# Patient Record
Sex: MALE | Race: WHITE
[De-identification: names, ages, dates, MRNs, and addresses within clinical notes are randomized per-mention and may not be internally consistent; named-entity substitution may affect disease eponyms.]

---

## 2019-10-24 ENCOUNTER — HOSPITAL ENCOUNTER (INPATIENT)
Dept: HOSPITAL 35 - ER | Age: 74
LOS: 7 days | DRG: 207 | End: 2019-10-31
Attending: INTERNAL MEDICINE | Admitting: INTERNAL MEDICINE
Payer: COMMERCIAL

## 2019-10-24 VITALS — SYSTOLIC BLOOD PRESSURE: 141 MMHG | DIASTOLIC BLOOD PRESSURE: 84 MMHG

## 2019-10-24 VITALS — SYSTOLIC BLOOD PRESSURE: 121 MMHG | DIASTOLIC BLOOD PRESSURE: 75 MMHG

## 2019-10-24 VITALS — SYSTOLIC BLOOD PRESSURE: 131 MMHG | DIASTOLIC BLOOD PRESSURE: 81 MMHG

## 2019-10-24 VITALS — SYSTOLIC BLOOD PRESSURE: 94 MMHG | DIASTOLIC BLOOD PRESSURE: 65 MMHG

## 2019-10-24 VITALS — DIASTOLIC BLOOD PRESSURE: 58 MMHG | SYSTOLIC BLOOD PRESSURE: 90 MMHG

## 2019-10-24 VITALS — DIASTOLIC BLOOD PRESSURE: 99 MMHG | SYSTOLIC BLOOD PRESSURE: 137 MMHG

## 2019-10-24 VITALS — SYSTOLIC BLOOD PRESSURE: 128 MMHG | DIASTOLIC BLOOD PRESSURE: 90 MMHG

## 2019-10-24 VITALS — DIASTOLIC BLOOD PRESSURE: 67 MMHG | SYSTOLIC BLOOD PRESSURE: 90 MMHG

## 2019-10-24 VITALS — DIASTOLIC BLOOD PRESSURE: 65 MMHG | SYSTOLIC BLOOD PRESSURE: 94 MMHG

## 2019-10-24 VITALS — DIASTOLIC BLOOD PRESSURE: 82 MMHG | SYSTOLIC BLOOD PRESSURE: 133 MMHG

## 2019-10-24 VITALS — SYSTOLIC BLOOD PRESSURE: 123 MMHG | DIASTOLIC BLOOD PRESSURE: 81 MMHG

## 2019-10-24 VITALS — SYSTOLIC BLOOD PRESSURE: 119 MMHG | DIASTOLIC BLOOD PRESSURE: 83 MMHG

## 2019-10-24 VITALS — DIASTOLIC BLOOD PRESSURE: 70 MMHG | SYSTOLIC BLOOD PRESSURE: 97 MMHG

## 2019-10-24 VITALS — DIASTOLIC BLOOD PRESSURE: 76 MMHG | SYSTOLIC BLOOD PRESSURE: 117 MMHG

## 2019-10-24 VITALS — HEIGHT: 74.02 IN | BODY MASS INDEX: 29.65 KG/M2 | WEIGHT: 231.04 LBS

## 2019-10-24 VITALS — DIASTOLIC BLOOD PRESSURE: 79 MMHG | SYSTOLIC BLOOD PRESSURE: 122 MMHG

## 2019-10-24 VITALS — DIASTOLIC BLOOD PRESSURE: 74 MMHG | SYSTOLIC BLOOD PRESSURE: 120 MMHG

## 2019-10-24 VITALS — DIASTOLIC BLOOD PRESSURE: 98 MMHG | SYSTOLIC BLOOD PRESSURE: 126 MMHG

## 2019-10-24 VITALS — DIASTOLIC BLOOD PRESSURE: 89 MMHG | SYSTOLIC BLOOD PRESSURE: 146 MMHG

## 2019-10-24 VITALS — SYSTOLIC BLOOD PRESSURE: 96 MMHG | DIASTOLIC BLOOD PRESSURE: 77 MMHG

## 2019-10-24 VITALS — DIASTOLIC BLOOD PRESSURE: 87 MMHG | SYSTOLIC BLOOD PRESSURE: 138 MMHG

## 2019-10-24 VITALS — DIASTOLIC BLOOD PRESSURE: 76 MMHG | SYSTOLIC BLOOD PRESSURE: 126 MMHG

## 2019-10-24 VITALS — SYSTOLIC BLOOD PRESSURE: 100 MMHG | DIASTOLIC BLOOD PRESSURE: 77 MMHG

## 2019-10-24 VITALS — SYSTOLIC BLOOD PRESSURE: 113 MMHG | DIASTOLIC BLOOD PRESSURE: 83 MMHG

## 2019-10-24 VITALS — SYSTOLIC BLOOD PRESSURE: 111 MMHG | DIASTOLIC BLOOD PRESSURE: 69 MMHG

## 2019-10-24 VITALS — SYSTOLIC BLOOD PRESSURE: 103 MMHG | DIASTOLIC BLOOD PRESSURE: 63 MMHG

## 2019-10-24 VITALS — DIASTOLIC BLOOD PRESSURE: 76 MMHG | SYSTOLIC BLOOD PRESSURE: 109 MMHG

## 2019-10-24 DIAGNOSIS — G25.3: ICD-10-CM

## 2019-10-24 DIAGNOSIS — Z93.1: ICD-10-CM

## 2019-10-24 DIAGNOSIS — I47.1: ICD-10-CM

## 2019-10-24 DIAGNOSIS — I42.9: ICD-10-CM

## 2019-10-24 DIAGNOSIS — I25.10: ICD-10-CM

## 2019-10-24 DIAGNOSIS — D72.829: ICD-10-CM

## 2019-10-24 DIAGNOSIS — J96.22: ICD-10-CM

## 2019-10-24 DIAGNOSIS — J93.9: ICD-10-CM

## 2019-10-24 DIAGNOSIS — Z82.49: ICD-10-CM

## 2019-10-24 DIAGNOSIS — E11.9: ICD-10-CM

## 2019-10-24 DIAGNOSIS — G93.1: ICD-10-CM

## 2019-10-24 DIAGNOSIS — G47.33: ICD-10-CM

## 2019-10-24 DIAGNOSIS — Z88.0: ICD-10-CM

## 2019-10-24 DIAGNOSIS — Z95.5: ICD-10-CM

## 2019-10-24 DIAGNOSIS — J96.21: Primary | ICD-10-CM

## 2019-10-24 DIAGNOSIS — Z87.891: ICD-10-CM

## 2019-10-24 DIAGNOSIS — E87.1: ICD-10-CM

## 2019-10-24 DIAGNOSIS — Z88.2: ICD-10-CM

## 2019-10-24 DIAGNOSIS — I10: ICD-10-CM

## 2019-10-24 DIAGNOSIS — E78.5: ICD-10-CM

## 2019-10-24 DIAGNOSIS — J43.9: ICD-10-CM

## 2019-10-24 DIAGNOSIS — I46.9: ICD-10-CM

## 2019-10-24 DIAGNOSIS — Z66: ICD-10-CM

## 2019-10-24 LAB
ANION GAP SERPL CALC-SCNC: 12 MMOL/L (ref 7–16)
ANION GAP SERPL CALC-SCNC: 6 MMOL/L (ref 7–16)
ANION GAP SERPL CALC-SCNC: 9 MMOL/L (ref 7–16)
APTT BLD: 27.6 SECONDS (ref 24.5–32.8)
APTT BLD: 27.6 SECONDS (ref 24.5–32.8)
APTT BLD: 27.8 SECONDS (ref 24.5–32.8)
BASOPHILS NFR BLD AUTO: 0.1 % (ref 0–2)
BASOPHILS NFR BLD AUTO: 0.1 % (ref 0–2)
BASOPHILS NFR BLD AUTO: 0.3 % (ref 0–2)
BASOPHILS NFR BLD AUTO: 1.3 % (ref 0–2)
BE(VIVO): -2.1 MMOL/L
BE(VIVO): -2.8 MMOL/L
BE(VIVO): -3.6 MMOL/L
BE(VIVO): -8.8 MMOL/L
BUN SERPL-MCNC: 17 MG/DL (ref 7–18)
BUN SERPL-MCNC: 18 MG/DL (ref 7–18)
BUN SERPL-MCNC: 19 MG/DL (ref 7–18)
CALCIUM SERPL-MCNC: 7.9 MG/DL (ref 8.5–10.1)
CALCIUM SERPL-MCNC: 8 MG/DL (ref 8.5–10.1)
CALCIUM SERPL-MCNC: 8.1 MG/DL (ref 8.5–10.1)
CALCIUM SERPL-MCNC: 8.2 MG/DL (ref 8.5–10.1)
CHLORIDE SERPL-SCNC: 100 MMOL/L (ref 98–107)
CHLORIDE SERPL-SCNC: 95 MMOL/L (ref 98–107)
CHLORIDE SERPL-SCNC: 98 MMOL/L (ref 98–107)
CO2 SERPL-SCNC: 22 MMOL/L (ref 21–32)
CO2 SERPL-SCNC: 25 MMOL/L (ref 21–32)
CO2 SERPL-SCNC: 26 MMOL/L (ref 21–32)
CREAT SERPL-MCNC: 0.6 MG/DL (ref 0.7–1.3)
CREAT SERPL-MCNC: 0.7 MG/DL (ref 0.7–1.3)
CREAT SERPL-MCNC: 1.2 MG/DL (ref 0.7–1.3)
EOSINOPHIL NFR BLD: 0 % (ref 0–3)
EOSINOPHIL NFR BLD: 0.1 % (ref 0–3)
EOSINOPHIL NFR BLD: 0.1 % (ref 0–3)
EOSINOPHIL NFR BLD: 1.1 % (ref 0–3)
ERYTHROCYTE [DISTWIDTH] IN BLOOD BY AUTOMATED COUNT: 14.2 % (ref 10.5–14.5)
ERYTHROCYTE [DISTWIDTH] IN BLOOD BY AUTOMATED COUNT: 14.4 % (ref 10.5–14.5)
ERYTHROCYTE [DISTWIDTH] IN BLOOD BY AUTOMATED COUNT: 14.5 % (ref 10.5–14.5)
ERYTHROCYTE [DISTWIDTH] IN BLOOD BY AUTOMATED COUNT: 14.8 % (ref 10.5–14.5)
GAS PNL BLDV: 35.6 MMHG (ref 35–45)
GLUCOSE SERPL-MCNC: 141 MG/DL (ref 74–106)
GLUCOSE SERPL-MCNC: 179 MG/DL (ref 74–106)
GLUCOSE SERPL-MCNC: 352 MG/DL (ref 74–106)
GRANULOCYTES NFR BLD MANUAL: 68.2 % (ref 36–66)
GRANULOCYTES NFR BLD MANUAL: 89.2 % (ref 36–66)
GRANULOCYTES NFR BLD MANUAL: 89.4 % (ref 36–66)
GRANULOCYTES NFR BLD MANUAL: 90.2 % (ref 36–66)
HCO3 BLD-SCNC: 20.8 MMOL/L (ref 22–26)
HCO3 BLD-SCNC: 23.1 MMOL/L (ref 22–26)
HCO3 BLD-SCNC: 23.8 MMOL/L (ref 22–26)
HCO3 BLD-SCNC: 23.9 MMOL/L (ref 22–26)
HCT VFR BLD CALC: 24.1 % (ref 42–52)
HCT VFR BLD CALC: 24.4 % (ref 42–52)
HCT VFR BLD CALC: 25.8 % (ref 42–52)
HCT VFR BLD CALC: 26.8 % (ref 42–52)
HGB BLD-MCNC: 8.1 GM/DL (ref 14–18)
HGB BLD-MCNC: 8.1 GM/DL (ref 14–18)
HGB BLD-MCNC: 8.4 GM/DL (ref 14–18)
HGB BLD-MCNC: 9 GM/DL (ref 14–18)
INR PPP: 1.2
INR PPP: 1.2
INR PPP: 1.3
LYMPHOCYTES NFR BLD AUTO: 21.6 % (ref 24–44)
LYMPHOCYTES NFR BLD AUTO: 4.8 % (ref 24–44)
LYMPHOCYTES NFR BLD AUTO: 5 % (ref 24–44)
LYMPHOCYTES NFR BLD AUTO: 5.8 % (ref 24–44)
MAGNESIUM SERPL-MCNC: 2.1 MG/DL (ref 1.8–2.4)
MAGNESIUM SERPL-MCNC: 2.5 MG/DL (ref 1.8–2.4)
MAGNESIUM SERPL-MCNC: 3.1 MG/DL (ref 1.8–2.4)
MCH RBC QN AUTO: 30.3 PG (ref 26–34)
MCH RBC QN AUTO: 30.9 PG (ref 26–34)
MCH RBC QN AUTO: 31.1 PG (ref 26–34)
MCH RBC QN AUTO: 31.1 PG (ref 26–34)
MCHC RBC AUTO-ENTMCNC: 32.4 G/DL (ref 28–37)
MCHC RBC AUTO-ENTMCNC: 33.1 G/DL (ref 28–37)
MCHC RBC AUTO-ENTMCNC: 33.5 G/DL (ref 28–37)
MCHC RBC AUTO-ENTMCNC: 33.6 G/DL (ref 28–37)
MCV RBC: 91.6 FL (ref 80–100)
MCV RBC: 91.8 FL (ref 80–100)
MCV RBC: 92.9 FL (ref 80–100)
MCV RBC: 95.8 FL (ref 80–100)
MONOCYTES NFR BLD: 4.6 % (ref 1–8)
MONOCYTES NFR BLD: 4.7 % (ref 1–8)
MONOCYTES NFR BLD: 5.6 % (ref 1–8)
MONOCYTES NFR BLD: 7.8 % (ref 1–8)
NEUTROPHILS # BLD: 11.1 THOU/UL (ref 1.4–8.2)
NEUTROPHILS # BLD: 11.9 THOU/UL (ref 1.4–8.2)
NEUTROPHILS # BLD: 13.4 THOU/UL (ref 1.4–8.2)
NEUTROPHILS # BLD: 8.3 THOU/UL (ref 1.4–8.2)
PCO2 BLD: 41 MMHG (ref 35–45)
PCO2 BLD: 50.9 MMHG (ref 35–45)
PCO2 BLD: 66.7 MMHG (ref 35–45)
PCO2 BLDV: 51.9 MMHG (ref 41–51)
PHOSPHATE SERPL-MCNC: 3.1 MG/DL (ref 2.5–4.9)
PHOSPHATE SERPL-MCNC: 3.7 MG/DL (ref 2.5–4.9)
PHOSPHATE SERPL-MCNC: 8.3 MG/DL (ref 2.5–4.9)
PLATELET # BLD: 388 THOU/UL (ref 150–400)
PLATELET # BLD: 396 THOU/UL (ref 150–400)
PLATELET # BLD: 457 THOU/UL (ref 150–400)
PLATELET # BLD: 545 THOU/UL (ref 150–400)
PO2 BLD: 113.6 MMHG (ref 80–100)
PO2 BLD: 144.4 MMHG (ref 80–100)
PO2 BLD: 84.6 MMHG (ref 80–100)
POTASSIUM SERPL-SCNC: 4 MMOL/L (ref 3.5–5.1)
POTASSIUM SERPL-SCNC: 4 MMOL/L (ref 3.5–5.1)
POTASSIUM SERPL-SCNC: 4.7 MMOL/L (ref 3.5–5.1)
PROTHROMBIN TIME: 12.5 SECONDS (ref 9.3–11.4)
PROTHROMBIN TIME: 12.8 SECONDS (ref 9.3–11.4)
PROTHROMBIN TIME: 13.1 SECONDS (ref 9.3–11.4)
RBC # BLD AUTO: 2.62 MIL/UL (ref 4.5–6)
RBC # BLD AUTO: 2.66 MIL/UL (ref 4.5–6)
RBC # BLD AUTO: 2.69 MIL/UL (ref 4.5–6)
RBC # BLD AUTO: 2.89 MIL/UL (ref 4.5–6)
SODIUM SERPL-SCNC: 129 MMOL/L (ref 136–145)
SODIUM SERPL-SCNC: 132 MMOL/L (ref 136–145)
SODIUM SERPL-SCNC: 132 MMOL/L (ref 136–145)
TROPONIN I SERPL-MCNC: 0.19 NG/ML (ref ?–0.06)
TROPONIN I SERPL-MCNC: 0.25 NG/ML (ref ?–0.06)
TROPONIN I SERPL-MCNC: <0.06 NG/ML (ref ?–0.06)
WBC # BLD AUTO: 12.2 THOU/UL (ref 4–11)
WBC # BLD AUTO: 12.4 THOU/UL (ref 4–11)
WBC # BLD AUTO: 13.2 THOU/UL (ref 4–11)
WBC # BLD AUTO: 15 THOU/UL (ref 4–11)

## 2019-10-24 PROCEDURE — 0W9930Z DRAINAGE OF RIGHT PLEURAL CAVITY WITH DRAINAGE DEVICE, PERCUTANEOUS APPROACH: ICD-10-PCS

## 2019-10-24 PROCEDURE — 03HY32Z INSERTION OF MONITORING DEVICE INTO UPPER ARTERY, PERCUTANEOUS APPROACH: ICD-10-PCS

## 2019-10-24 PROCEDURE — 85026: CPT

## 2019-10-24 PROCEDURE — 02HV33Z INSERTION OF INFUSION DEVICE INTO SUPERIOR VENA CAVA, PERCUTANEOUS APPROACH: ICD-10-PCS

## 2019-10-24 PROCEDURE — 0BH17EZ INSERTION OF ENDOTRACHEAL AIRWAY INTO TRACHEA, VIA NATURAL OR ARTIFICIAL OPENING: ICD-10-PCS | Performed by: INTERNAL MEDICINE

## 2019-10-24 PROCEDURE — 10078: CPT

## 2019-10-24 PROCEDURE — B548ZZA ULTRASONOGRAPHY OF SUPERIOR VENA CAVA, GUIDANCE: ICD-10-PCS

## 2019-10-24 PROCEDURE — 5A1955Z RESPIRATORY VENTILATION, GREATER THAN 96 CONSECUTIVE HOURS: ICD-10-PCS | Performed by: INTERNAL MEDICINE

## 2019-10-24 NOTE — EMS
[facility]
[address]
[place]   [address]                     EMS Patient Care Report       
_______________________________________________________________________________
 
Name:       MARGARITA GIBSON               Room #:         170-1       ADM IN  
M.R.#:      [id number]                       Account #:      [id number]  
Admission:  10/24/19    Attend Phys:    Sunny Trevino MD    
Discharge:              Date of Birth:  [birth year]  
                                                          Report #: [id number]
                                                                    [id number]
_______________________________________________________________________________
THIS REPORT FOR:   //name//                          
 
Report Transmitted: 10/24/2019 06:39
EMS Care Summary
[facility] MED-ACT
Incident [id number] @ 10/24/2019 02:39
 
Incident Location
[address] [place] [address]

[place] [address]
 
Patient
MARGARITA GIBSON
Male, 74 Years
 1945
 
Patient Address
[facility] [address]
 
Patient History
Cancer, Unspecified,Morbid Obesity,
 
 
 
Chief Complaint
"His trachea is dislodged"
 
Disposition
Transported [place]
 
Dispatch Reason
Breathing Problem
 
Transported To
[facility]
 
Narrative
Upon arrival pt was found with facility staff and S47. Pt was being held in a 
sitting position by staff. Pt was combative. Ventilations were being attempted 
via BVM to tracheostomy, pt had irregular, labored respirations, significant 
amount of blood on pt's airway, nose, stoma and pt's clothes. Pts skin was 
ashen and cool. Pt was very agitated, not following commands and not tracking 
with eyes. Crew was informed by facility that pt has a tracheostomy after 
having reconstructive mandible surgery secondary to cancer. Tonight staff found 
 
 
 
[facility]
[address]
[place]   [address]                     EMS Patient Care Report       
_______________________________________________________________________________
 
Name:       MARGARITA GIBSON               Room #:         170-1       ADM IN  
M.R.#:      [id number]                       Account #:      [id number]  
Admission:  10/24/19    Attend Phys:    Sunny Trevino MD    
Discharge:              Date of Birth:  [birth year]  
                                                          Report #: [id number]
                                                                    [id number]
_______________________________________________________________________________
pt restless and hypoxic after pt pulled his tracheostomy out. Pt had 
significant hemorrhage as well from his stoma and upper airway. Facility RT 
attempted placing trach back in pt's stoma but soon after that requested EMS as 
pt was severely agitated and making it difficult for facility's RT to bag him. 
Facility staff noted PTA that pt was rapidly developing swelling on his neck 
around his stoma as well as on his L upper chest. Pt's presentation continued 
to to worsen PTA. Upon arrival rapid assessment was performed. Vitals were 
obtained as possible considering pt's restless and crew's efforts to secure an 
airway. Pt's ventilation were compromised as pt's trach was very positional and 
leaking air. Pt stopped having any purposeful movement. Pt was moved to 
[facility]. Facility's RT removed pt's tracheostomy and placed an ET tube in 
pt's stoma. Blood was suctioned out of pt's ET tube and airway. Pt was placed 
on cardiac monitor. Shortly after tube placement pt went into cardiac arrest. 
Chest compressions were started. Pt's cardiac rhythm was asystole. Tube 
placement was confirmed. IO access was obtained and first dose of Epinephrine 
was given. CPR and ventilations were continued giving pt a total of 3 doses of 
Epinephrine. Pts airway remained positional and needing frequent suctioning. 
ROSC was obtained. 12 lead ECG was obtained being unremarkable. Pt was secured, 
was taken to ambulance and was transported. Detailed assessment was done en 
route. Vitals were monitored and rechecked. IV access was obtained while en 
route. Pt began to have intermittent spontaneous respirations during transport 
but did not have any purposeful movement nor did he follow commands. Transport 
to ED was slightly delayed due to miscommunication with the responder driving 
. Pt care was transferred to ED staff. Pt was moved to ED bed without 
complications. 
 
Initial Vitals
@03:55VjLV2: 71,MI Suspected: false
@03:75UaLB6: 24,MI Suspected: false
@03:40P: 59,R: 16,EtCO2: 52,
@03:20P: 100,R: 0,BP: 148/105,GCS: 3,EtCO2: 90,Revised Trauma: 4,MI Suspected: 
false 
@03:08P: 0,R: 12,BP: 0/0,GCS: 3,EtCO2: 18,SpO2: 62,Revised Trauma: 4,
@03:34P: 89,R: 14,BP: 181/152,GCS: 3,Glucose: 215,EtCO2: 57,Revised Trauma: 
8,MI Suspected: false 
@03:29P: 100,R: 17,BP: 145/100,GCS: 3,EtCO2: 64,Revised Trauma: 8,MI Suspected: 
false 
@02:52P: 113,R: 10,BP: 156/112,GCS: 6,Glucose: 215,EtCO2: 42,SpO2: 68,Revised 
Trauma: 10, 
 
Assessments
@02:54MENTAL:Combative,Confused,SKIN:Cyanotic,HEENT:Head/Face: 
Other,Neck/Airway: Compromised,Neck/Airway: SubQ Air,Neck/Airway: 
Other,Neck/Airway: Obstructed,Eyes: No Abnormalities,LUNG SOUNDS:General: No 
Abnormalities,Left Upper: No Abnormalities,Right Upper: No Abnormalities,Left 
Lower: No Abnormalities,Right Lower: No Abnormalities,ABDOMEN:General: No 
 
 
 
[facility]
[address]
[place]   [address]                     EMS Patient Care Report       
_______________________________________________________________________________
 
Name:       MARGARITA GIBSON               Room #:         170-1       [facility] [place]  
[facility]#:      [id number]                       Account #:      [id number]  
Admission:  10/24/19    Attend Phys:    Sunny Trevino MD    
Discharge:              Date of Birth:  [birth year]  
                                                          Report #: [id number]
                                                                    [id number]
_______________________________________________________________________________
Abnormalities,Left Upper: No Abnormalities,Right Upper: No Abnormalities,Left 
Lower: No Abnormalities,Right Lower: No 
Abnormalities,PELVIS//GI:EXTREMITIES:Left Arm: No Abnormalities,Right Arm: No 
Abnormalities,Left Leg: No Abnormalities,Right Leg: No 
Abnormalities,PULSE:NEURO: 
 
Impression
Tracheostomy problem
 
Procedures
@03:2412-Lead ECGResponse: UnchangedSucceeded@03:00Response: 
ImprovedSucceeded@03:08Epinephrine 1:10 - 1 Milligrams (mg) - Intraosseous 
(IO)Response: Unchanged@03:20Epinephrine 1:10 - 1 Milligrams (mg) - 
Intraosseous (IO)Response: Improved@03:10Saline Lock 0cc (20 ga) Site: 
Hand-LeftResponse: UnchangedFailed@03:14Epinephrine 1:10 - 1 Milligrams (mg) - 
Intraosseous (IO)Response: Unchanged@03:40Saline Lock 10cc (20 ga) Site: 
Forearm-RightResponse: UnchangedSucceeded@PTAOxygen FlowRate: 25 Device: Bag 
Valve Mask (BVM) Response: UnchangedFailed@02:59Orotracheal Intubation    
Complications: Cardiac Arrest,Response: UnchangedSucceeded@03:06Normal Saline 
(.9% NaCl) 300cc (EZ-IO (Blue 25mm)) Site: IO-Tibia-Right ProximalResponse: 
UnchangedSucceeded@03:22Response: Unchanged@02:59Suction   Response: 
UnchangedSucceeded 
 
Timeline
PTA,Oxygen FlowRate: 25 Device: Bag Valve Mask (BVM) Response: UnchangedFailed,
02:38,Call Received
02:38,Psap Call
02:39,Dispatched
02:40,En Route
02:51,On Scene
02:51,At Patient
02:52,BP: 156/112 M,PULSE: 113,RR: 10 R,SPO2: 68 Ox,ETCO2: 42 ,B,PAIN: 
,GCS: 6, 
02:59,Orotracheal Intubation    Complications: Cardiac Arrest,,Response: 
UnchangedSucceeded, 
02:59,Suction   Response: UnchangedSucceeded,
03:00,Response: ImprovedSucceeded,
03:06,Normal Saline (.9% NaCl) 300cc EZ-IO (Blue 25mm) Site: IO-Tibia-Right 
Proximal,Response: UnchangedSucceeded, 
03:06,BP: / M,PULSE: ,RR:  R,SPO2:  Ox,ETCO2: 24 ,BG: ,PAIN: ,GCS: ,
03:08,Epinephrine 1:10 - 1 Milligrams (mg) - Intraosseous (IO),Response: 
Unchanged 
03:08,BP: 0/0 M,PULSE: 0,RR: 12 R,SPO2: 62 Ox,ETCO2: 18 ,BG: ,PAIN: ,GCS: 3,
03:10,Saline Lock 0cc 20 ga Site: Hand-Left,Response: UnchangedFailed,
03:14,Epinephrine 1:10 - 1 Milligrams (mg) - Intraosseous (IO),Response: 
Unchanged 
 
 
 
[facility]
[address] [place]   [address]                     EMS Patient Care Report       
_______________________________________________________________________________
 
Name:       MARGARITA GIBSON               Room #:         170-1       ADM IN  
ANJELICA#:      [id number]                       Account #:      [id number]  
Admission:  10/24/19    Attend Phys:    Sunny Trevino MD    
Discharge:              Date of Birth:  [birth year]  
                                                          Report #: [id number]
                                                                    [id number]
_______________________________________________________________________________
03:20,Epinephrine 1:10 - 1 Milligrams (mg) - Intraosseous (IO),Response: 
Improved 
03:20,BP: 148/105 M,PULSE: 100,RR: 0 R,SPO2:  Ox,ETCO2: 90 ,BG: ,PAIN: ,GCS: 3,
03:22,Response: Unchanged
03:24,12-Lead ECG,Response: UnchangedSucceeded,
03:24,BP: / M,PULSE: ,RR:  R,SPO2:  Ox,ETCO2: 71 ,BG: ,PAIN: ,GCS: ,
03:29,BP: 145/100 M,PULSE: 100,RR: 17 R,SPO2:  Ox,ETCO2: 64 ,BG: ,PAIN: ,GCS: 3,
03:32,Depart Scene
03:34,BP: 181/152 M,PULSE: 89,RR: 14 R,SPO2:  Ox,ETCO2: 57 ,B,PAIN: ,GCS: 
3, 
03:40,Saline Lock 10cc 20 ga Site: Forearm-Right,Response: UnchangedSucceeded,
03:40,BP: / M,PULSE: 59,RR: 16 R,SPO2:  Ox,ETCO2: 52 ,BG: ,PAIN: ,GCS: ,
03:45,At Destination
04:28,Call Closed
 
Disclaimer
v1.1     Copyright 2019 [employer]
This EMS Care Summary contains data elements from the applicable legal record 
(which may be displayed differently). It is designed to provide pertinent 
information for the following purposes: continuity of care, clinical quality, 
and state data reporting. The complete legal record is available to ED staff 
and administrators of the receiving hospital in [employer]'s Patient Tracker. All data 
is provided "as is."

## 2019-10-24 NOTE — HC
[facility]
[address]
[place]   [address]                     CONSULTATION                  
_______________________________________________________________________________
 
Name:       ARTHURMARGARITA E               Room #:         244-P       [facility] [place]  
..#:      [id number]                       Account #:      [id number]  
Admission:  10/24/19    Attend Phys:    Isra García
Discharge:              Date of Birth:  [birth year]  
                                                          Report #: [id number]
                                                                    [id number]   
_______________________________________________________________________________
THIS REPORT FOR:   //name//                          
 
CC: Edenilson Lamas
 
DATE OF SERVICE:  10/30/2019
 
 
ADDENDUM
 
He is presently an inpatient in the intensive care unit.  The patient had
placement of a tracheostomy tube on 10/24/2019.  At that time, his tracheostomy
tube was out as it had been accidentally displaced while he was in a rehab
center.  A code was performed and a small endotracheal tube was placed into the
tracheostomy stoma in order to ventilate him.  When I saw him, I was unable to
replace the tracheostomy tube because of the small size of his tracheal stoma. 
I had to serially dilate the opening by placing larger endotracheal tubes
starting with a 6, then 7.5 and then 8.  I left 8 in place for several hours
before going back and ultimately being able to place an extra length #7
tracheostomy tube.  The incision was enlarged and the tracheal incision was
enlarged by serial dilation.
 
 
 
 
 
 
 
 
 
 
 
 
 
 
 
 
 
 
 
 
 
 
                         
   By:                               
                   
D: 10/30/19 1633                           _____________________________________
T: 10/30/19 1729                           Demarcus Forman MD            /nt

## 2019-10-24 NOTE — NUR
Dr Salinas working on placing central line. Dr García by and talked with
Dr Salinas. Cardiology nurse practitioner, Kacie also here to see patient.
NS started at 125 ml/hr per orders from Dr Salinas.

## 2019-10-24 NOTE — NUR
Dr Forman (ENT) here and talked with pt wife about exchanging ET tube
currently in trach site for new trach. RT (Annamarie) here to assist at the
bedside.

## 2019-10-24 NOTE — NUR
Right chest tube placed by Dr Clarke. Portable chest film obtained.
Echocardiogram tech here and did quick look and will be back to do complete
echocardiogram.  Wife in at time of chest tube placement and signed consent.

## 2019-10-24 NOTE — NUR
Dr Cotton by to see pt.  Pt has occasional brief jerking movements.  Levophed
gtt was started to support BP while Propofol gtt titrated upward to suppress
jerking movements.  Left chest tube dressing saturated with red drainage.
New dressing applied.  Intraosseous IV dc'd and sterile drsg applied.

## 2019-10-24 NOTE — NUR
CM ASSESSMENT:
CASE OPENED FOR DC PLANNING. CLINICAL INFO REVIEWED. [facility]AB AFTER HOSPITALIZATION AT [facility] 10/15 TO 10/22. HX ORAL
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 PRIOR TO [facility]
ADMIT, LIVING WITH SPOUSE. ADMIT TO [facility] YESTERDAY, CARDIAC ARREST AFTER TRACH
CAME OUT. ON VENT 100% FIO2, REQUIRED RIGHT CHEST TUBE. TODAY ENT REPLACED ETT
TO TRACH STOMA WITH 8.0 TRACH. DR. GARCIA SPOKE WITH PT'S SPOUSE THIS AM AND
FAMILY IN THROUGHOUT DAY. UPDATED [facility] ADMISSIONS.

## 2019-10-24 NOTE — EKG
[address]  [address]
Phone:  [phone]                    ELECTROCARDIOGRAM REPORT      
_______________________________________________________________________________
 
Name:       ALE GIBSONTRACEY CALLAWAY               Room #:         170-1       ADM IN  
M.R.#:      [id number]     Account #:      [id number]  
Admission:  10/24/19    Attend Phys:    Sunny Trevino MD    
Discharge:              Date of Birth:  [birth year]  
                                                          Report #: [id number]
   [id number]
_______________________________________________________________________________
THIS REPORT FOR:   //name//                          
 
                         [facility] ED
                                       
Test Date:    2019-10-24               Test Time:    04:33:36
Pat Name:     MARGARITA GIBSON            Department:   
Patient ID:   [id number]            Room:         170
Gender:       M                        Technician:   coty
:          1945               Requested By: Percy Rubio
Order Number: [id number] MD:   Santi Bolton
                                 Measurements
Intervals                              Axis          
Rate:         89                       P:            
FL:                                    QRS:          -75
QRSD:         109                      T:            81
QT:           380                                    
QTc:          463                                    
                           Interpretive Statements
Atrial fibrillation
Multi interpolated vent premature complexes
Markedly posterior QRS axis
Low voltage, extremity and precordial leads
Lead(s) I were not used for morphology analysis
No previous ECG available for comparison
 
Electronically Signed On 10- 7:51:41 CDT by Santi Bolton
[email or web address]
 
 
 
 
 
 
 
 
 
 
 
 
 
 
 
 
  <ELECTRONICALLY SIGNED>
   By: Santi Bolton MD        
  10/24/19     0751
D: 10/24/19 0433                           _____________________________________
T: 10/24/19 0433                           Santi Bolton MD          /EPI

## 2019-10-24 NOTE — 2DMMODE
[address]  [address]
Phone:  [phone]                    2 D/M-MODE ECHOCARDIOGRAM     
_______________________________________________________________________________
 
Name:            MARGARITA GIBSON NILTON               Room #:        244-P       ADM IN
M.R.#:           [id number]          Account #:     [id number]  
Admission:       10/24/19         Attend Phys:   Sunny Trevino MD
Discharge:                  Date of Birth: [birth year]  
                         Report #:      [id number]
        [id number]
_______________________________________________________________________________
THIS REPORT FOR:   //name//                          
 
 
--------------- APPROVED REPORT --------------
 
 
Study performed:  10/24/2019 12:14:50
 
EXAM: Limited 2D and color flow Echocardiogram 
Patient Location: ICU    
Room #:  [facility]     Status:  routine
 
      BSA:         2.13
HR: 63 bpm BP:          109/73 mmHg 
 
Other Information 
Study Quality: Technically Limited
Technically limited study due to  inability to position patient, 
pneumothorax with chest 
tube.
 
Indications
Diabetes
CAD
Hypertension/HDD
S/P cardiac arrest
 
Left Ventricle
Left ventricular systolic function is moderately decreased. LVEF is 
40%.
 
Aortic Valve
The aortic valve is normal in structure. No aortic regurgitation is 
present.
 
Mitral Valve
The mitral valve is normal in structure.
 
Tricuspid Valve
The tricuspid valve is normal in structure. Trace tricuspid 
regurgitation.
 
Pulmonic Valve
The pulmonary valve is normal in structure.
 
[facility]
 
 
[address]  [address]
Phone:  [phone]                    2 D/M-MODE ECHOCARDIOGRAM     
_______________________________________________________________________________
 
Name:            MARGARITA GIBSON               Room #:        244-P       [facility] [place]
[facility].#:           [id number]          Account #:     [id number]  
Admission:       10/24/19         Attend Phys:   Sunny Trevino MD
Discharge:                  Date of Birth: [birth year]  
                         Report #:      [id number]
        [id number]
_______________________________________________________________________________
IVC is normal in size and collapses >50% with 
inspiration.
 
Pericardium
There is no pericardial effusion.
 
<Conclusion>
Very limited study
Left ventricular systolic function is moderately decreased.
LVEF is 40%.
The aortic valve is normal in structure.
The mitral valve is normal in structure.
There is no pericardial effusion.
 
 
 
 
 
 
 
 
 
 
 
 
 
 
 
 
 
 
 
 
 
 
 
 
 
 
 
 
 
 
 
  <ELECTRONICALLY SIGNED>
   By: Julian Macias MD, FACC   
  10/24/19     1551
D: 10/24/19 1551                           _____________________________________
T: 10/24/19 1551                           Julian Macias MD, FACC     /INF

## 2019-10-24 NOTE — NUR
Dr Clarke at bedside preparing for emergent right chest tube placement.
ET tube retaped by RT, Annamarie-repositioned from 15 cm to 13 cm. (4.5 cm ET
tube). Procedure done emergently. Propofol gtt started now.  Pt having
intermittent jerking. Versed 2 mg given IV now.

## 2019-10-24 NOTE — NUR
Dr Forman back at bedside after previous visit.  ET tube 8.0 was left in
stoma until now and he is here to exchange for trach.
Shiley 6.0 XLT cuffed tracheostomy placed now by Dr Forman. Annamarie PENA and
students here to assist with the trach placement.
Ventilator setting:   FIO2 100% Rate 24 PEEP 5
Pt on Propofol at 30 mcg/kg/min. Breathing pattern continues to be labored
with use of accessory muscles.  O2 sat 100%. Will increase Propofol to 40
mmcg/kg/min. Heart rate 60-70's.

## 2019-10-25 VITALS — DIASTOLIC BLOOD PRESSURE: 74 MMHG | SYSTOLIC BLOOD PRESSURE: 117 MMHG

## 2019-10-25 VITALS — SYSTOLIC BLOOD PRESSURE: 115 MMHG | DIASTOLIC BLOOD PRESSURE: 61 MMHG

## 2019-10-25 VITALS — SYSTOLIC BLOOD PRESSURE: 97 MMHG | DIASTOLIC BLOOD PRESSURE: 61 MMHG

## 2019-10-25 VITALS — SYSTOLIC BLOOD PRESSURE: 112 MMHG | DIASTOLIC BLOOD PRESSURE: 64 MMHG

## 2019-10-25 VITALS — SYSTOLIC BLOOD PRESSURE: 81 MMHG | DIASTOLIC BLOOD PRESSURE: 50 MMHG

## 2019-10-25 VITALS — SYSTOLIC BLOOD PRESSURE: 100 MMHG | DIASTOLIC BLOOD PRESSURE: 64 MMHG

## 2019-10-25 VITALS — DIASTOLIC BLOOD PRESSURE: 51 MMHG | SYSTOLIC BLOOD PRESSURE: 95 MMHG

## 2019-10-25 VITALS — SYSTOLIC BLOOD PRESSURE: 75 MMHG | DIASTOLIC BLOOD PRESSURE: 54 MMHG

## 2019-10-25 LAB
ALBUMIN SERPL-MCNC: 2.3 G/DL (ref 3.4–5)
ALT SERPL-CCNC: 58 U/L (ref 30–65)
ANION GAP SERPL CALC-SCNC: 12 MMOL/L (ref 7–16)
ANION GAP SERPL CALC-SCNC: 8 MMOL/L (ref 7–16)
APTT BLD: 25.2 SECONDS (ref 24.5–32.8)
APTT BLD: 26.1 SECONDS (ref 24.5–32.8)
AST SERPL-CCNC: 70 U/L (ref 15–37)
BASOPHILS NFR BLD AUTO: 0.1 % (ref 0–2)
BASOPHILS NFR BLD AUTO: 0.3 % (ref 0–2)
BE(VIVO): -3.7 MMOL/L
BILIRUB SERPL-MCNC: 1.1 MG/DL
BUN SERPL-MCNC: 14 MG/DL (ref 7–18)
BUN SERPL-MCNC: 16 MG/DL (ref 7–18)
CALCIUM SERPL-MCNC: 8.1 MG/DL (ref 8.5–10.1)
CALCIUM SERPL-MCNC: 8.2 MG/DL (ref 8.5–10.1)
CHLORIDE SERPL-SCNC: 100 MMOL/L (ref 98–107)
CHLORIDE SERPL-SCNC: 98 MMOL/L (ref 98–107)
CO2 SERPL-SCNC: 22 MMOL/L (ref 21–32)
CO2 SERPL-SCNC: 24 MMOL/L (ref 21–32)
CREAT SERPL-MCNC: 0.6 MG/DL (ref 0.7–1.3)
CREAT SERPL-MCNC: 0.6 MG/DL (ref 0.7–1.3)
EOSINOPHIL NFR BLD: 0.1 % (ref 0–3)
EOSINOPHIL NFR BLD: 0.2 % (ref 0–3)
ERYTHROCYTE [DISTWIDTH] IN BLOOD BY AUTOMATED COUNT: 14.3 % (ref 10.5–14.5)
ERYTHROCYTE [DISTWIDTH] IN BLOOD BY AUTOMATED COUNT: 14.4 % (ref 10.5–14.5)
GLUCOSE SERPL-MCNC: 158 MG/DL (ref 74–106)
GLUCOSE SERPL-MCNC: 177 MG/DL (ref 74–106)
GRANULOCYTES NFR BLD MANUAL: 87 % (ref 36–66)
GRANULOCYTES NFR BLD MANUAL: 87.7 % (ref 36–66)
HCO3 BLD-SCNC: 21.9 MMOL/L (ref 22–26)
HCT VFR BLD CALC: 24 % (ref 42–52)
HCT VFR BLD CALC: 24.5 % (ref 42–52)
HGB BLD-MCNC: 8 GM/DL (ref 14–18)
HGB BLD-MCNC: 8.2 GM/DL (ref 14–18)
INR PPP: 1.2
INR PPP: 1.2
LYMPHOCYTES NFR BLD AUTO: 7.6 % (ref 24–44)
LYMPHOCYTES NFR BLD AUTO: 8.8 % (ref 24–44)
MAGNESIUM SERPL-MCNC: 2.4 MG/DL (ref 1.8–2.4)
MAGNESIUM SERPL-MCNC: 2.4 MG/DL (ref 1.8–2.4)
MCH RBC QN AUTO: 30.7 PG (ref 26–34)
MCH RBC QN AUTO: 30.9 PG (ref 26–34)
MCHC RBC AUTO-ENTMCNC: 33.2 G/DL (ref 28–37)
MCHC RBC AUTO-ENTMCNC: 33.7 G/DL (ref 28–37)
MCV RBC: 91.7 FL (ref 80–100)
MCV RBC: 92.4 FL (ref 80–100)
MONOCYTES NFR BLD: 3.7 % (ref 1–8)
MONOCYTES NFR BLD: 4.5 % (ref 1–8)
NEUTROPHILS # BLD: 11.9 THOU/UL (ref 1.4–8.2)
NEUTROPHILS # BLD: 12.2 THOU/UL (ref 1.4–8.2)
PCO2 BLD: 41.8 MMHG (ref 35–45)
PHOSPHATE SERPL-MCNC: 3.5 MG/DL (ref 2.5–4.9)
PHOSPHATE SERPL-MCNC: 3.9 MG/DL (ref 2.5–4.9)
PLATELET # BLD: 499 THOU/UL (ref 150–400)
PLATELET # BLD: 532 THOU/UL (ref 150–400)
PO2 BLD: 246.3 MMHG (ref 80–100)
POTASSIUM SERPL-SCNC: 3.6 MMOL/L (ref 3.5–5.1)
POTASSIUM SERPL-SCNC: 3.9 MMOL/L (ref 3.5–5.1)
PROT SERPL-MCNC: 6.1 G/DL (ref 6.4–8.2)
PROTHROMBIN TIME: 12.2 SECONDS (ref 9.3–11.4)
PROTHROMBIN TIME: 12.3 SECONDS (ref 9.3–11.4)
RBC # BLD AUTO: 2.6 MIL/UL (ref 4.5–6)
RBC # BLD AUTO: 2.67 MIL/UL (ref 4.5–6)
SODIUM SERPL-SCNC: 132 MMOL/L (ref 136–145)
SODIUM SERPL-SCNC: 132 MMOL/L (ref 136–145)
TROPONIN I SERPL-MCNC: 0.3 NG/ML (ref ?–0.06)
TROPONIN I SERPL-MCNC: 0.45 NG/ML (ref ?–0.06)
WBC # BLD AUTO: 13.6 THOU/UL (ref 4–11)
WBC # BLD AUTO: 14 THOU/UL (ref 4–11)

## 2019-10-25 NOTE — NUR
Hypothermia protocol followed through this shift.  Pt core temp remains
33.1-34.7 C on artic sun.  Propofol titrated to keep pt sedated and prevent
shivering; fentanyl given IVP for breakthrough shivering.  Pt's respirations
frequently labored despite vent.  FiO2 titrated by RT from 100% down to 50%,
sat remains 100%.  Monitor sinus remy to sinus rhythm, occasional PAC's and
PVC's.  Bilateral chest tubes remain patent without air leak or crepitus.

## 2019-10-25 NOTE — NUR
UPDATE TO [facility] ADMISSIONS. REMAINS IN CRITICAL CONDITION. DR. GARCIA
COMMUNICATING WITH FAMILY DAILY.

## 2019-10-25 NOTE — NUR
Attempted to titrate down Propofol, however at 70 mcg/kg/min pt became more
tachypnic and labored, dropped O2 sat to 88%.  FiO2 titrated up to 60% from
50% without change in O2 sat.  Pt was on right side, change to supine,
semi-fowlers without change in work of breathing or O2 sat.  Pt suctioned,
only scant blood tinge sputum obtained.  Both chest tubes patent, no air leak
or crepitus noted.  Propofol increased back to 75 mcg/kg/min, sat now staying
>/= 90%, breathing pattern back to how pt was at change of shift.

## 2019-10-25 NOTE — NUR
Pt reached target temperature at 2000.  Artic sun now off and pt maintaining
rectal temp of 37 C.  Spoke with Dr. Patel on the phone.  Plan to do EEG in
a.m., will start pt on IV Keppra tonight for constant jerking movements.  Dr. Patel made aware that pt's wife would like to meet with her tomorrow after EEG
completed.  Pt remains on Propofol at 85 mcg/kg/min and Levophed 13 mcg/min

## 2019-10-25 NOTE — NUR
pt started re-warming this am. reached a temp of 36.5 at 1851. remained on
levophed for pressure support. noted ectopy, increased - cardiology aware.
remains sedated on propofol for comfort. prn fentanyl given. Neruo evaluated
patient. eeg done. plan for another tomorrow morning. no reflexs present but
over breathing vent.

## 2019-10-25 NOTE — NUR
Dr. Clarke notified of patient status and interventions taken.  Stat CXR
ordered. Dr. Clarke on way to see patient.

## 2019-10-26 VITALS — DIASTOLIC BLOOD PRESSURE: 83 MMHG | SYSTOLIC BLOOD PRESSURE: 93 MMHG

## 2019-10-26 VITALS — DIASTOLIC BLOOD PRESSURE: 52 MMHG | SYSTOLIC BLOOD PRESSURE: 95 MMHG

## 2019-10-26 VITALS — DIASTOLIC BLOOD PRESSURE: 61 MMHG | SYSTOLIC BLOOD PRESSURE: 112 MMHG

## 2019-10-26 VITALS — SYSTOLIC BLOOD PRESSURE: 103 MMHG | DIASTOLIC BLOOD PRESSURE: 55 MMHG

## 2019-10-26 VITALS — SYSTOLIC BLOOD PRESSURE: 132 MMHG | DIASTOLIC BLOOD PRESSURE: 71 MMHG

## 2019-10-26 VITALS — SYSTOLIC BLOOD PRESSURE: 93 MMHG | DIASTOLIC BLOOD PRESSURE: 53 MMHG

## 2019-10-26 VITALS — DIASTOLIC BLOOD PRESSURE: 55 MMHG | SYSTOLIC BLOOD PRESSURE: 109 MMHG

## 2019-10-26 VITALS — SYSTOLIC BLOOD PRESSURE: 91 MMHG | DIASTOLIC BLOOD PRESSURE: 82 MMHG

## 2019-10-26 VITALS — DIASTOLIC BLOOD PRESSURE: 54 MMHG | SYSTOLIC BLOOD PRESSURE: 81 MMHG

## 2019-10-26 VITALS — SYSTOLIC BLOOD PRESSURE: 112 MMHG | DIASTOLIC BLOOD PRESSURE: 78 MMHG

## 2019-10-26 VITALS — SYSTOLIC BLOOD PRESSURE: 120 MMHG | DIASTOLIC BLOOD PRESSURE: 59 MMHG

## 2019-10-26 VITALS — DIASTOLIC BLOOD PRESSURE: 63 MMHG | SYSTOLIC BLOOD PRESSURE: 103 MMHG

## 2019-10-26 VITALS — DIASTOLIC BLOOD PRESSURE: 65 MMHG | SYSTOLIC BLOOD PRESSURE: 82 MMHG

## 2019-10-26 VITALS — SYSTOLIC BLOOD PRESSURE: 105 MMHG | DIASTOLIC BLOOD PRESSURE: 67 MMHG

## 2019-10-26 VITALS — SYSTOLIC BLOOD PRESSURE: 110 MMHG | DIASTOLIC BLOOD PRESSURE: 49 MMHG

## 2019-10-26 VITALS — DIASTOLIC BLOOD PRESSURE: 61 MMHG | SYSTOLIC BLOOD PRESSURE: 92 MMHG

## 2019-10-26 VITALS — DIASTOLIC BLOOD PRESSURE: 72 MMHG | SYSTOLIC BLOOD PRESSURE: 124 MMHG

## 2019-10-26 VITALS — DIASTOLIC BLOOD PRESSURE: 74 MMHG | SYSTOLIC BLOOD PRESSURE: 126 MMHG

## 2019-10-26 VITALS — DIASTOLIC BLOOD PRESSURE: 67 MMHG | SYSTOLIC BLOOD PRESSURE: 120 MMHG

## 2019-10-26 VITALS — SYSTOLIC BLOOD PRESSURE: 118 MMHG | DIASTOLIC BLOOD PRESSURE: 68 MMHG

## 2019-10-26 VITALS — SYSTOLIC BLOOD PRESSURE: 91 MMHG | DIASTOLIC BLOOD PRESSURE: 76 MMHG

## 2019-10-26 VITALS — SYSTOLIC BLOOD PRESSURE: 96 MMHG | DIASTOLIC BLOOD PRESSURE: 53 MMHG

## 2019-10-26 VITALS — DIASTOLIC BLOOD PRESSURE: 55 MMHG | SYSTOLIC BLOOD PRESSURE: 114 MMHG

## 2019-10-26 VITALS — SYSTOLIC BLOOD PRESSURE: 91 MMHG | DIASTOLIC BLOOD PRESSURE: 57 MMHG

## 2019-10-26 NOTE — NUR
Pt's breathing slightly less labored after Versed 2 mg IVP at 2345 per order
Dr. Clarke and increasing Propofol back to 80 mcg/kg/min.  O2 sat 99% on FiO2
100%.  pCXR done at 2345 and reviewed by Dr. Clarke showed chest tubes
unchanged and no new pneumothorax.

## 2019-10-26 NOTE — NUR
PATIENT NON-RESPONSIVE, FULL NEUROLOGY ASSESSMENT COMPLETED BY NEUROLOGIST AT
THE BEDSIDE. PATIENT DOES NOT HAVE CORNEAL/PAIN/GAG REFLEX. ON NORMALTHERMIC
TEMPERATURE CONTROL, TEMPERATURE REMAINED AT 37 DEGREES CELCIUS. FAMILY
EDUCATED ON CONDITION BY BOTH PULMONOLOGY AND NEUROLOGY. PLAN IS TO WEAN OF
PROPOFOL AND CONTINUE VERSED DRIP PENDING EEG IN THE MORNING. BILATERAL CHEST
TUBES INTACT WITH NO OUTPUT. GRANDA PATENT. VENTILATOR SETTINGS WEANED TO
80%FIO2. PATIENT DOES NOT TOLERATE MINIMAL SEDATION, SHOWS SEIZURE LIKE
MOVEMENT WHEN PROPOFOL IS PAUSED. CURRENTLY ON BOTH PROPOFOL WHICH WAS WEANED
FROM 70 TO 45 MCG, AND VERSED AT 3. REPORT GIVEN TO ONCOMING RN, WILL CONTINUE
TO MONITOR.

## 2019-10-26 NOTE — NUR
At 2125 noted that Left chest tube had significant air leak that was not
present at 2000 assessment.  Dr. Clarke notified and stat portable chest x-ray
done.  Awaiting results to call to Dr. Samano.  Pt's O2 sat was 98% on FiO2 80%
but respirations were in 30's.  Pt now has sat of 93% on 80% FiO, sinus
tachycardia in 120-130 range, respirations 29.

## 2019-10-26 NOTE — NUR
Pt off artic sun for approximately 1 hour and rectal temp dropped to 35.9.
Artic sun restarted on normotherapeutic mode.

## 2019-10-27 VITALS — SYSTOLIC BLOOD PRESSURE: 96 MMHG | DIASTOLIC BLOOD PRESSURE: 57 MMHG

## 2019-10-27 VITALS — DIASTOLIC BLOOD PRESSURE: 56 MMHG | SYSTOLIC BLOOD PRESSURE: 98 MMHG

## 2019-10-27 VITALS — SYSTOLIC BLOOD PRESSURE: 111 MMHG | DIASTOLIC BLOOD PRESSURE: 57 MMHG

## 2019-10-27 VITALS — SYSTOLIC BLOOD PRESSURE: 93 MMHG | DIASTOLIC BLOOD PRESSURE: 44 MMHG

## 2019-10-27 VITALS — DIASTOLIC BLOOD PRESSURE: 67 MMHG | SYSTOLIC BLOOD PRESSURE: 117 MMHG

## 2019-10-27 VITALS — DIASTOLIC BLOOD PRESSURE: 63 MMHG | SYSTOLIC BLOOD PRESSURE: 112 MMHG

## 2019-10-27 VITALS — SYSTOLIC BLOOD PRESSURE: 112 MMHG | DIASTOLIC BLOOD PRESSURE: 64 MMHG

## 2019-10-27 VITALS — SYSTOLIC BLOOD PRESSURE: 118 MMHG | DIASTOLIC BLOOD PRESSURE: 66 MMHG

## 2019-10-27 VITALS — DIASTOLIC BLOOD PRESSURE: 51 MMHG | SYSTOLIC BLOOD PRESSURE: 97 MMHG

## 2019-10-27 VITALS — DIASTOLIC BLOOD PRESSURE: 71 MMHG | SYSTOLIC BLOOD PRESSURE: 122 MMHG

## 2019-10-27 VITALS — DIASTOLIC BLOOD PRESSURE: 68 MMHG | SYSTOLIC BLOOD PRESSURE: 115 MMHG

## 2019-10-27 VITALS — SYSTOLIC BLOOD PRESSURE: 133 MMHG | DIASTOLIC BLOOD PRESSURE: 69 MMHG

## 2019-10-27 VITALS — DIASTOLIC BLOOD PRESSURE: 60 MMHG | SYSTOLIC BLOOD PRESSURE: 104 MMHG

## 2019-10-27 VITALS — DIASTOLIC BLOOD PRESSURE: 66 MMHG | SYSTOLIC BLOOD PRESSURE: 118 MMHG

## 2019-10-27 VITALS — DIASTOLIC BLOOD PRESSURE: 66 MMHG | SYSTOLIC BLOOD PRESSURE: 115 MMHG

## 2019-10-27 VITALS — DIASTOLIC BLOOD PRESSURE: 57 MMHG | SYSTOLIC BLOOD PRESSURE: 96 MMHG

## 2019-10-27 VITALS — SYSTOLIC BLOOD PRESSURE: 106 MMHG | DIASTOLIC BLOOD PRESSURE: 58 MMHG

## 2019-10-27 VITALS — DIASTOLIC BLOOD PRESSURE: 58 MMHG | SYSTOLIC BLOOD PRESSURE: 102 MMHG

## 2019-10-27 VITALS — SYSTOLIC BLOOD PRESSURE: 114 MMHG | DIASTOLIC BLOOD PRESSURE: 62 MMHG

## 2019-10-27 VITALS — SYSTOLIC BLOOD PRESSURE: 132 MMHG | DIASTOLIC BLOOD PRESSURE: 73 MMHG

## 2019-10-27 VITALS — SYSTOLIC BLOOD PRESSURE: 112 MMHG | DIASTOLIC BLOOD PRESSURE: 65 MMHG

## 2019-10-27 VITALS — DIASTOLIC BLOOD PRESSURE: 59 MMHG | SYSTOLIC BLOOD PRESSURE: 98 MMHG

## 2019-10-27 VITALS — SYSTOLIC BLOOD PRESSURE: 102 MMHG | DIASTOLIC BLOOD PRESSURE: 58 MMHG

## 2019-10-27 VITALS — SYSTOLIC BLOOD PRESSURE: 76 MMHG | DIASTOLIC BLOOD PRESSURE: 42 MMHG

## 2019-10-27 VITALS — DIASTOLIC BLOOD PRESSURE: 60 MMHG | SYSTOLIC BLOOD PRESSURE: 110 MMHG

## 2019-10-27 VITALS — SYSTOLIC BLOOD PRESSURE: 116 MMHG | DIASTOLIC BLOOD PRESSURE: 65 MMHG

## 2019-10-27 VITALS — DIASTOLIC BLOOD PRESSURE: 65 MMHG | SYSTOLIC BLOOD PRESSURE: 115 MMHG

## 2019-10-27 VITALS — SYSTOLIC BLOOD PRESSURE: 116 MMHG | DIASTOLIC BLOOD PRESSURE: 74 MMHG

## 2019-10-27 VITALS — DIASTOLIC BLOOD PRESSURE: 66 MMHG | SYSTOLIC BLOOD PRESSURE: 121 MMHG

## 2019-10-27 VITALS — DIASTOLIC BLOOD PRESSURE: 65 MMHG | SYSTOLIC BLOOD PRESSURE: 108 MMHG

## 2019-10-27 VITALS — SYSTOLIC BLOOD PRESSURE: 103 MMHG | DIASTOLIC BLOOD PRESSURE: 61 MMHG

## 2019-10-27 VITALS — DIASTOLIC BLOOD PRESSURE: 59 MMHG | SYSTOLIC BLOOD PRESSURE: 112 MMHG

## 2019-10-27 LAB
ALBUMIN SERPL-MCNC: 1.6 G/DL (ref 3.4–5)
ALT SERPL-CCNC: 34 U/L (ref 30–65)
ANION GAP SERPL CALC-SCNC: 7 MMOL/L (ref 7–16)
AST SERPL-CCNC: 65 U/L (ref 15–37)
BASOPHILS NFR BLD AUTO: 0 % (ref 0–2)
BE(VIVO): -4 MMOL/L
BILIRUB SERPL-MCNC: 1.1 MG/DL
BUN SERPL-MCNC: 10 MG/DL (ref 7–18)
CALCIUM SERPL-MCNC: 8.2 MG/DL (ref 8.5–10.1)
CHLORIDE SERPL-SCNC: 97 MMOL/L (ref 98–107)
CO2 SERPL-SCNC: 27 MMOL/L (ref 21–32)
CREAT SERPL-MCNC: 0.7 MG/DL (ref 0.7–1.3)
EOSINOPHIL NFR BLD: 3 % (ref 0–3)
ERYTHROCYTE [DISTWIDTH] IN BLOOD BY AUTOMATED COUNT: 14.7 % (ref 10.5–14.5)
GLUCOSE SERPL-MCNC: 159 MG/DL (ref 74–106)
GRANULOCYTES NFR BLD MANUAL: 38 % (ref 36–66)
HCO3 BLD-SCNC: 23 MMOL/L (ref 22–26)
HCT VFR BLD CALC: 23.6 % (ref 42–52)
HGB BLD-MCNC: 7.9 GM/DL (ref 14–18)
LYMPHOCYTES NFR BLD AUTO: 12 % (ref 24–44)
MCH RBC QN AUTO: 30.9 PG (ref 26–34)
MCHC RBC AUTO-ENTMCNC: 33.3 G/DL (ref 28–37)
MCV RBC: 92.6 FL (ref 80–100)
METAMYELOCYTES NFR BLD: 5 %
MONOCYTES NFR BLD: 2 % (ref 1–8)
NEUTROPHILS # BLD: 7.8 THOU/UL (ref 1.4–8.2)
NEUTS BAND NFR BLD: 40 % (ref 0–8)
NUCLEATED RBCS: 1 /100WBC
PCO2 BLD: 51.5 MMHG (ref 35–45)
PLATELET # BLD EST: NORMAL 10*3/UL
PLATELET # BLD: 485 THOU/UL (ref 150–400)
PO2 BLD: 75.7 MMHG (ref 80–100)
POTASSIUM SERPL-SCNC: 4.6 MMOL/L (ref 3.5–5.1)
PROT SERPL-MCNC: 5.7 G/DL (ref 6.4–8.2)
RBC # BLD AUTO: 2.55 MIL/UL (ref 4.5–6)
RBC MORPH BLD: NORMAL
SODIUM SERPL-SCNC: 131 MMOL/L (ref 136–145)
WBC # BLD AUTO: 10 THOU/UL (ref 4–11)

## 2019-10-27 NOTE — NUR
Pt remains non-responsive, increasing tachycardia in 120-130 range with
occassional short runs of SVT in 160's.  Unable to turn pt due to hemodynamic
instability.  Bed on rotation and pt turned briefly to check for breakdown q 4
hours.

## 2019-10-27 NOTE — NUR
0830-JHOANA ERVIN IN. AWARE OF CONT'D TACHYCARDIA,FEVER,HIGH
BANDS.WILL ATTEMPT TO TITRATE & WEAN OFF GTTS,PER .--VW
1000-PROPOFOL OFF. VERSED UP TO 6MG/HR FOR TACHYCARDIA,TACHYPNEA W/O MUCH
AFFECT. FENTANYL GIVEN (?PAIN/DISCOMFORT) BUT NO RESULTS ACHIEVED. LEVO
TITRATED DOWN TO 15MCG/MIN BUT HAD TO BE INC'D DUE TO MAP<60mmHG. GTT
DEPENDENT.PT REEKS OF PSUDOMONAS.NO FAMILY PRESENT YET.--VW

## 2019-10-27 NOTE — NUR
DTR ASKING ABOUT DNR STATUS-STATES PT WOULD NOT WANT TO CONTINUE LIKE
THIS.PLANS TO D/W HER MOM.MUCH EMO SUPPORT GIVEN.--VW

## 2019-10-27 NOTE — NUR
WIFE CALLED IN, STATING SHE THOUGHT PT WAS ALREADY A DNR.SHE REQUESTS THAT PT
BE MADE A DNR,STATES SHE,THE DTR,S-I-L & HIS BROTHER ARE ALL IN AGREEMENT.PAGE
TO  VIA A.S.--VW

## 2019-10-28 VITALS — SYSTOLIC BLOOD PRESSURE: 99 MMHG | DIASTOLIC BLOOD PRESSURE: 59 MMHG

## 2019-10-28 VITALS — SYSTOLIC BLOOD PRESSURE: 109 MMHG | DIASTOLIC BLOOD PRESSURE: 67 MMHG

## 2019-10-28 VITALS — SYSTOLIC BLOOD PRESSURE: 96 MMHG | DIASTOLIC BLOOD PRESSURE: 59 MMHG

## 2019-10-28 VITALS — SYSTOLIC BLOOD PRESSURE: 94 MMHG | DIASTOLIC BLOOD PRESSURE: 59 MMHG

## 2019-10-28 VITALS — DIASTOLIC BLOOD PRESSURE: 59 MMHG | SYSTOLIC BLOOD PRESSURE: 100 MMHG

## 2019-10-28 VITALS — DIASTOLIC BLOOD PRESSURE: 63 MMHG | SYSTOLIC BLOOD PRESSURE: 106 MMHG

## 2019-10-28 VITALS — SYSTOLIC BLOOD PRESSURE: 92 MMHG | DIASTOLIC BLOOD PRESSURE: 59 MMHG

## 2019-10-28 VITALS — DIASTOLIC BLOOD PRESSURE: 69 MMHG | SYSTOLIC BLOOD PRESSURE: 114 MMHG

## 2019-10-28 VITALS — SYSTOLIC BLOOD PRESSURE: 94 MMHG | DIASTOLIC BLOOD PRESSURE: 53 MMHG

## 2019-10-28 VITALS — DIASTOLIC BLOOD PRESSURE: 61 MMHG | SYSTOLIC BLOOD PRESSURE: 102 MMHG

## 2019-10-28 VITALS — SYSTOLIC BLOOD PRESSURE: 92 MMHG | DIASTOLIC BLOOD PRESSURE: 60 MMHG

## 2019-10-28 VITALS — DIASTOLIC BLOOD PRESSURE: 56 MMHG | SYSTOLIC BLOOD PRESSURE: 93 MMHG

## 2019-10-28 VITALS — DIASTOLIC BLOOD PRESSURE: 63 MMHG | SYSTOLIC BLOOD PRESSURE: 113 MMHG

## 2019-10-28 VITALS — DIASTOLIC BLOOD PRESSURE: 60 MMHG | SYSTOLIC BLOOD PRESSURE: 101 MMHG

## 2019-10-28 NOTE — NUR
Pt remains intubated and sedated on versed. Versed remains due to tachycardia
and tachypnea when titrated down. EEG done this am. results communicated to
family by neurologist. plan for another eeg thursday morning. Pt with burst of
atrial tachycardia. cardiologist notifed. new orders of po amio. issues with
hypoxia - titrated fio2 up to 100 percent by respiratory. remains on levophed
at 16 for blood pressure support.

## 2019-10-29 VITALS — SYSTOLIC BLOOD PRESSURE: 115 MMHG | DIASTOLIC BLOOD PRESSURE: 70 MMHG

## 2019-10-29 VITALS — SYSTOLIC BLOOD PRESSURE: 126 MMHG | DIASTOLIC BLOOD PRESSURE: 70 MMHG

## 2019-10-29 VITALS — SYSTOLIC BLOOD PRESSURE: 117 MMHG | DIASTOLIC BLOOD PRESSURE: 67 MMHG

## 2019-10-29 VITALS — SYSTOLIC BLOOD PRESSURE: 119 MMHG | DIASTOLIC BLOOD PRESSURE: 68 MMHG

## 2019-10-29 VITALS — DIASTOLIC BLOOD PRESSURE: 64 MMHG | SYSTOLIC BLOOD PRESSURE: 110 MMHG

## 2019-10-29 VITALS — DIASTOLIC BLOOD PRESSURE: 73 MMHG | SYSTOLIC BLOOD PRESSURE: 134 MMHG

## 2019-10-29 VITALS — SYSTOLIC BLOOD PRESSURE: 118 MMHG | DIASTOLIC BLOOD PRESSURE: 70 MMHG

## 2019-10-29 VITALS — SYSTOLIC BLOOD PRESSURE: 102 MMHG | DIASTOLIC BLOOD PRESSURE: 64 MMHG

## 2019-10-29 VITALS — SYSTOLIC BLOOD PRESSURE: 128 MMHG | DIASTOLIC BLOOD PRESSURE: 68 MMHG

## 2019-10-29 VITALS — DIASTOLIC BLOOD PRESSURE: 57 MMHG | SYSTOLIC BLOOD PRESSURE: 105 MMHG

## 2019-10-29 VITALS — DIASTOLIC BLOOD PRESSURE: 68 MMHG | SYSTOLIC BLOOD PRESSURE: 131 MMHG

## 2019-10-29 VITALS — DIASTOLIC BLOOD PRESSURE: 65 MMHG | SYSTOLIC BLOOD PRESSURE: 115 MMHG

## 2019-10-29 VITALS — DIASTOLIC BLOOD PRESSURE: 72 MMHG | SYSTOLIC BLOOD PRESSURE: 129 MMHG

## 2019-10-29 VITALS — DIASTOLIC BLOOD PRESSURE: 67 MMHG | SYSTOLIC BLOOD PRESSURE: 127 MMHG

## 2019-10-29 VITALS — SYSTOLIC BLOOD PRESSURE: 133 MMHG | DIASTOLIC BLOOD PRESSURE: 68 MMHG

## 2019-10-29 VITALS — DIASTOLIC BLOOD PRESSURE: 64 MMHG | SYSTOLIC BLOOD PRESSURE: 118 MMHG

## 2019-10-29 VITALS — DIASTOLIC BLOOD PRESSURE: 68 MMHG | SYSTOLIC BLOOD PRESSURE: 110 MMHG

## 2019-10-29 NOTE — NUR
Pt in AF rate of 150-160, medication given it does decrease HR within 5 mins
HR-129 at this time.
Granddaugther at the bedside stated she has been updated crying emotional
support given.
will cont to monitor.

## 2019-10-29 NOTE — NUR
Nutrition: Pt is now DNR, unresponsive and no aggressive measures are planned.
Neuro plans repeat EEG on thursday. No plans to initate tube feeds. RD
deferring further evals unless POC changes.

## 2019-10-29 NOTE — NUR
REMAINS ON VENT IN CRITICAL CONDITION. NEURO FOLLOWING AND HAS MET WITH FAMILY
TO DISCUSS PROGNOSIS. RHOP ADMISSIONS UPDATED. SPIRITUAL CARE UPDATED
CLINICALLY AND ARE PROVIDING SUPPORT TO PT AND FAMILY.

## 2019-10-29 NOTE — EKG
[address]  [address]
Phone:  [phone]                    ELECTROCARDIOGRAM REPORT      
_______________________________________________________________________________
 
Name:       MARGARITA GIBSON               Room #:         244-P       ADM [place]  
M.R.#:      [id number]     Account #:      [id number]  
Admission:  10/24/19    Attend Phys:    Isra García
Discharge:              Date of Birth:  [birth year]  
                                                          Report #: [id number]
   [id number]
_______________________________________________________________________________
THIS REPORT FOR:   //name//                          
 
                          [facility]
                                       
Test Date:    2019-10-28               Test Time:    08:14:38
Pat Name:     MARGARITA GIBSON            Department:   
Patient ID:   [id number]            Room:         244 
Gender:       M                        Technician:   CHU MOMIN
:          1945               Requested By: Blaire Gonzalez
Order Number: [id number] MD:   Santi Bolton
                                 Measurements
Intervals                              Axis          
Rate:         134                      P:            3
NY:           116                      QRS:          -37
QRSD:         84                       T:            107
QT:           302                                    
QTc:          451                                    
                           Interpretive Statements
Sinus tachycardia
Atrial premature complex
Inferior infarct, old
Anterior infarct, old
Lateral leads are also involved
 
Electronically Signed On 10- 8:33:10 CDT by Santi Bolton
[email or web address]
 
 
 
 
 
 
 
 
 
 
 
 
 
 
 
 
 
  <ELECTRONICALLY SIGNED>
   By: Santi Bolton MD        
  10/29/19     0833
D: 10/28/19 0814                           _____________________________________
T: 10/28/19 0814                           Santi Bolton MD          /ABBEY

## 2019-10-29 NOTE — NUR
Patient remains on the vent and sedated with Versed gtt. Patient is still on
100% FIO2. Patient also remains on Levophed for pressure support. No brainstem
reflexes is noted although patient does overbreathe the ventilator. Patient
continues to have malodorous secretions that appear to be coming through the
nostrils as well as orally. HR remains ST with a short run of SVT noted.
 
No acute events occurred during this shift and hourly rounding completed.
Patient not progressing toward goal.

## 2019-10-30 VITALS — DIASTOLIC BLOOD PRESSURE: 73 MMHG | SYSTOLIC BLOOD PRESSURE: 124 MMHG

## 2019-10-30 VITALS — DIASTOLIC BLOOD PRESSURE: 71 MMHG | SYSTOLIC BLOOD PRESSURE: 130 MMHG

## 2019-10-30 VITALS — DIASTOLIC BLOOD PRESSURE: 58 MMHG | SYSTOLIC BLOOD PRESSURE: 115 MMHG

## 2019-10-30 VITALS — SYSTOLIC BLOOD PRESSURE: 129 MMHG | DIASTOLIC BLOOD PRESSURE: 74 MMHG

## 2019-10-30 VITALS — SYSTOLIC BLOOD PRESSURE: 146 MMHG | DIASTOLIC BLOOD PRESSURE: 77 MMHG

## 2019-10-30 VITALS — SYSTOLIC BLOOD PRESSURE: 134 MMHG | DIASTOLIC BLOOD PRESSURE: 73 MMHG

## 2019-10-30 VITALS — DIASTOLIC BLOOD PRESSURE: 65 MMHG | SYSTOLIC BLOOD PRESSURE: 130 MMHG

## 2019-10-30 VITALS — SYSTOLIC BLOOD PRESSURE: 134 MMHG | DIASTOLIC BLOOD PRESSURE: 72 MMHG

## 2019-10-30 VITALS — SYSTOLIC BLOOD PRESSURE: 127 MMHG | DIASTOLIC BLOOD PRESSURE: 72 MMHG

## 2019-10-30 VITALS — SYSTOLIC BLOOD PRESSURE: 123 MMHG | DIASTOLIC BLOOD PRESSURE: 66 MMHG

## 2019-10-30 VITALS — SYSTOLIC BLOOD PRESSURE: 126 MMHG | DIASTOLIC BLOOD PRESSURE: 70 MMHG

## 2019-10-30 VITALS — SYSTOLIC BLOOD PRESSURE: 123 MMHG | DIASTOLIC BLOOD PRESSURE: 74 MMHG

## 2019-10-30 VITALS — SYSTOLIC BLOOD PRESSURE: 135 MMHG | DIASTOLIC BLOOD PRESSURE: 77 MMHG

## 2019-10-30 VITALS — DIASTOLIC BLOOD PRESSURE: 72 MMHG | SYSTOLIC BLOOD PRESSURE: 136 MMHG

## 2019-10-30 VITALS — SYSTOLIC BLOOD PRESSURE: 144 MMHG | DIASTOLIC BLOOD PRESSURE: 79 MMHG

## 2019-10-30 VITALS — DIASTOLIC BLOOD PRESSURE: 69 MMHG | SYSTOLIC BLOOD PRESSURE: 120 MMHG

## 2019-10-30 VITALS — SYSTOLIC BLOOD PRESSURE: 137 MMHG | DIASTOLIC BLOOD PRESSURE: 79 MMHG

## 2019-10-30 VITALS — SYSTOLIC BLOOD PRESSURE: 136 MMHG | DIASTOLIC BLOOD PRESSURE: 76 MMHG

## 2019-10-30 VITALS — DIASTOLIC BLOOD PRESSURE: 73 MMHG | SYSTOLIC BLOOD PRESSURE: 130 MMHG

## 2019-10-30 VITALS — DIASTOLIC BLOOD PRESSURE: 76 MMHG | SYSTOLIC BLOOD PRESSURE: 121 MMHG

## 2019-10-30 VITALS — SYSTOLIC BLOOD PRESSURE: 108 MMHG | DIASTOLIC BLOOD PRESSURE: 54 MMHG

## 2019-10-30 VITALS — SYSTOLIC BLOOD PRESSURE: 125 MMHG | DIASTOLIC BLOOD PRESSURE: 76 MMHG

## 2019-10-30 VITALS — SYSTOLIC BLOOD PRESSURE: 133 MMHG | DIASTOLIC BLOOD PRESSURE: 71 MMHG

## 2019-10-30 VITALS — SYSTOLIC BLOOD PRESSURE: 130 MMHG | DIASTOLIC BLOOD PRESSURE: 71 MMHG

## 2019-10-30 VITALS — SYSTOLIC BLOOD PRESSURE: 135 MMHG | DIASTOLIC BLOOD PRESSURE: 72 MMHG

## 2019-10-30 VITALS — SYSTOLIC BLOOD PRESSURE: 137 MMHG | DIASTOLIC BLOOD PRESSURE: 77 MMHG

## 2019-10-30 VITALS — SYSTOLIC BLOOD PRESSURE: 141 MMHG | DIASTOLIC BLOOD PRESSURE: 74 MMHG

## 2019-10-30 VITALS — SYSTOLIC BLOOD PRESSURE: 121 MMHG | DIASTOLIC BLOOD PRESSURE: 68 MMHG

## 2019-10-30 VITALS — DIASTOLIC BLOOD PRESSURE: 69 MMHG | SYSTOLIC BLOOD PRESSURE: 128 MMHG

## 2019-10-30 VITALS — DIASTOLIC BLOOD PRESSURE: 58 MMHG | SYSTOLIC BLOOD PRESSURE: 108 MMHG

## 2019-10-30 VITALS — DIASTOLIC BLOOD PRESSURE: 74 MMHG | SYSTOLIC BLOOD PRESSURE: 129 MMHG

## 2019-10-30 VITALS — DIASTOLIC BLOOD PRESSURE: 57 MMHG | SYSTOLIC BLOOD PRESSURE: 114 MMHG

## 2019-10-30 VITALS — DIASTOLIC BLOOD PRESSURE: 72 MMHG | SYSTOLIC BLOOD PRESSURE: 127 MMHG

## 2019-10-30 VITALS — SYSTOLIC BLOOD PRESSURE: 116 MMHG | DIASTOLIC BLOOD PRESSURE: 73 MMHG

## 2019-10-30 VITALS — SYSTOLIC BLOOD PRESSURE: 137 MMHG | DIASTOLIC BLOOD PRESSURE: 74 MMHG

## 2019-10-30 VITALS — DIASTOLIC BLOOD PRESSURE: 75 MMHG | SYSTOLIC BLOOD PRESSURE: 139 MMHG

## 2019-10-30 VITALS — DIASTOLIC BLOOD PRESSURE: 57 MMHG | SYSTOLIC BLOOD PRESSURE: 108 MMHG

## 2019-10-30 VITALS — SYSTOLIC BLOOD PRESSURE: 123 MMHG | DIASTOLIC BLOOD PRESSURE: 75 MMHG

## 2019-10-30 VITALS — DIASTOLIC BLOOD PRESSURE: 74 MMHG | SYSTOLIC BLOOD PRESSURE: 132 MMHG

## 2019-10-30 VITALS — SYSTOLIC BLOOD PRESSURE: 114 MMHG | DIASTOLIC BLOOD PRESSURE: 64 MMHG

## 2019-10-30 VITALS — DIASTOLIC BLOOD PRESSURE: 80 MMHG | SYSTOLIC BLOOD PRESSURE: 145 MMHG

## 2019-10-30 VITALS — SYSTOLIC BLOOD PRESSURE: 135 MMHG | DIASTOLIC BLOOD PRESSURE: 79 MMHG

## 2019-10-30 VITALS — SYSTOLIC BLOOD PRESSURE: 114 MMHG | DIASTOLIC BLOOD PRESSURE: 59 MMHG

## 2019-10-30 VITALS — SYSTOLIC BLOOD PRESSURE: 128 MMHG | DIASTOLIC BLOOD PRESSURE: 72 MMHG

## 2019-10-30 VITALS — DIASTOLIC BLOOD PRESSURE: 69 MMHG | SYSTOLIC BLOOD PRESSURE: 122 MMHG

## 2019-10-30 VITALS — DIASTOLIC BLOOD PRESSURE: 73 MMHG | SYSTOLIC BLOOD PRESSURE: 131 MMHG

## 2019-10-30 VITALS — SYSTOLIC BLOOD PRESSURE: 129 MMHG | DIASTOLIC BLOOD PRESSURE: 73 MMHG

## 2019-10-30 VITALS — SYSTOLIC BLOOD PRESSURE: 128 MMHG | DIASTOLIC BLOOD PRESSURE: 66 MMHG

## 2019-10-30 VITALS — DIASTOLIC BLOOD PRESSURE: 74 MMHG | SYSTOLIC BLOOD PRESSURE: 133 MMHG

## 2019-10-30 VITALS — DIASTOLIC BLOOD PRESSURE: 74 MMHG | SYSTOLIC BLOOD PRESSURE: 134 MMHG

## 2019-10-30 VITALS — DIASTOLIC BLOOD PRESSURE: 71 MMHG | SYSTOLIC BLOOD PRESSURE: 128 MMHG

## 2019-10-30 VITALS — SYSTOLIC BLOOD PRESSURE: 135 MMHG | DIASTOLIC BLOOD PRESSURE: 76 MMHG

## 2019-10-30 VITALS — DIASTOLIC BLOOD PRESSURE: 69 MMHG | SYSTOLIC BLOOD PRESSURE: 124 MMHG

## 2019-10-30 VITALS — SYSTOLIC BLOOD PRESSURE: 126 MMHG | DIASTOLIC BLOOD PRESSURE: 71 MMHG

## 2019-10-30 VITALS — SYSTOLIC BLOOD PRESSURE: 109 MMHG | DIASTOLIC BLOOD PRESSURE: 66 MMHG

## 2019-10-30 NOTE — NUR
ASSESSMENTS AND INTERVENTIONS AS DOCCUMENTED. PATIENT REMAINS INTUBATED AND ON
LIGHT SEDATION. FAMILY AT BEDSIDE AND UPDATED ABOUT PLAN OF CARE. EMOTIONAL
SUPPORT GIVEN TO FAMILY. DR. BRUMFIELD IN TO TALK WITH FAMILY. FAMILY EXPRESSING
UNDERSTANDING. VERSED DCD AT 1136. PATIENT TOLERATING IT WELL. PLAN OF CARE IS
TO DO AN EEG ON 10/31/19.

## 2019-10-30 NOTE — NUR
PATIENT NON-RESPONSIVE. IV AMIODARONE STARTED AT DURING BEGINNING OF THE
SHIFT, HEART RATE REMAINED BELOW 160 AND STABALIZED TO SINUS RHYTHM.
VENTILATOR SETTINGS CHANGED AT 0600, FIO2 70% PATIENT O2 SATURATION REMAINS
ABOVE 94%. BILATERAL CHEST TUBES IN PLACE, PATIENT HAS INCREASED OUTPUT WHEN
TURNED TO LEFT SIDE AND FLAT, THERE IS A NOTICABLE DECREASE IN FLOW IN ANY
OTHER POSITION. NO SIGNS OF RESPIRATORY DISTRESS. WILL CONTINUE TO MONITOR.

## 2019-10-30 NOTE — NUR
Pt remains no changes from previous assessment. Continue to be off on
Levophed gtt. His MAP has been 70 or better. All lines/CTs are remains
inplaced. Transfer care to OLLIE Saxena.

## 2019-10-31 VITALS — SYSTOLIC BLOOD PRESSURE: 111 MMHG | DIASTOLIC BLOOD PRESSURE: 64 MMHG

## 2019-10-31 VITALS — DIASTOLIC BLOOD PRESSURE: 65 MMHG | SYSTOLIC BLOOD PRESSURE: 110 MMHG

## 2019-10-31 VITALS — DIASTOLIC BLOOD PRESSURE: 69 MMHG | SYSTOLIC BLOOD PRESSURE: 120 MMHG

## 2019-10-31 VITALS — DIASTOLIC BLOOD PRESSURE: 64 MMHG | SYSTOLIC BLOOD PRESSURE: 107 MMHG

## 2019-10-31 VITALS — DIASTOLIC BLOOD PRESSURE: 68 MMHG | SYSTOLIC BLOOD PRESSURE: 119 MMHG

## 2019-10-31 VITALS — DIASTOLIC BLOOD PRESSURE: 68 MMHG | SYSTOLIC BLOOD PRESSURE: 115 MMHG

## 2019-10-31 VITALS — SYSTOLIC BLOOD PRESSURE: 139 MMHG | DIASTOLIC BLOOD PRESSURE: 80 MMHG

## 2019-10-31 VITALS — SYSTOLIC BLOOD PRESSURE: 107 MMHG | DIASTOLIC BLOOD PRESSURE: 64 MMHG

## 2019-10-31 VITALS — DIASTOLIC BLOOD PRESSURE: 70 MMHG | SYSTOLIC BLOOD PRESSURE: 103 MMHG

## 2019-10-31 VITALS — DIASTOLIC BLOOD PRESSURE: 67 MMHG | SYSTOLIC BLOOD PRESSURE: 111 MMHG

## 2019-10-31 VITALS — DIASTOLIC BLOOD PRESSURE: 64 MMHG | SYSTOLIC BLOOD PRESSURE: 105 MMHG

## 2019-10-31 VITALS — SYSTOLIC BLOOD PRESSURE: 115 MMHG | DIASTOLIC BLOOD PRESSURE: 67 MMHG

## 2019-10-31 VITALS — SYSTOLIC BLOOD PRESSURE: 129 MMHG | DIASTOLIC BLOOD PRESSURE: 76 MMHG

## 2019-10-31 NOTE — NUR
DR. STOVALL STATES FAMILY WILL LET ME KNOW WHEN THEY ARE READY FOR COMFORT CARE.
 DR. GALAVIZ PAGED. AWAITING CALL BACK.  MONIKA DOLAN CALLED.  ORDERS GIVEN SEE
ORDERS.

## 2019-11-01 NOTE — EEG
[facility]
[address]
[place]  [address]                      ELECTROENCEPHALOGRAM          
_______________________________________________________________________________
 
Name:       MARGARITA GIBSON                Room #:         244-P       [facility] [place]  
M.R.#:      [id number]      Account #:      [id number]  
Admission:  10/24/19     Attend Phys:    Isra Flaherty
Discharge:  10/31/19     Date of Birth:  [birth year]  
                                                           Report #: [id number]
    [id number]  
_______________________________________________________________________________
THIS REPORT FOR:   //name//                          
 
CC: Edenilson Lamas
 
DATE OF SERVICE:  10/28/2019
 
 
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
 
EEG is severely abnormal.  Intermittently, activity is noticed on the left side,
which looks more epileptiform activity.  Otherwise, the activity is markedly
suppressed on both sides.  It is so low voltage, it is not possible to tell what
the frequency is.  Photic stimulation is unremarkable.
 
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  EEG
is also asymmetrical raising the possibility of ischemic changes.  There is a
distinct deterioration in the patient's EEG.  Clinical correlation is
recommended to see if the patient is on any sedation or not.
 
 
 
 
 
 
 
 
 
 
 
 
 
 
 
 
 
 
 
 
 
 
 
 
       <ELECTRONICALLY SIGNED>
   By: Norbert Ibrahim MD         
  11/01/19     1417
D: 10/28/19 1423                           _____________________________________
T: 10/28/19 1432                           Norbert Ibrahim MD           /nt

## 2019-11-01 NOTE — EEG
[facility]
[address]
[place]  [address]                      ELECTROENCEPHALOGRAM          
_______________________________________________________________________________
 
Name:       MARGARITA GIBSON                Room #:         244-P       [facility] [place]  
M.R.#:      [id number]      Account #:      [id number]  
Admission:  10/24/19     Attend Phys:    Isra Flaherty
Discharge:  10/31/19     Date of Birth:  [birth year]  
                                                           Report #: [id number]
    [id number]  
_______________________________________________________________________________
THIS REPORT FOR:   //name//                          
 
CC: Edenilson Lamas
 
DATE OF SERVICE:  10/31/2019
 
 
FINDINGS:  This patient's EEG was done by putting the electrode by standard
10-20 system of electrode placement.  Both referential and sequential montages
were used for recording.  Sedation was discontinued for 24 hours prior to
getting this EEG.  EEG demonstrates very low voltage activity, if activity at
all.  That is difficult to separate from the artifact and the amplitude is less
than 5.  Photic stimulation is unremarkable.  This EEG has further deteriorated
since last time.
 
IMPRESSION:  This patient's EEG is severely abnormal consistent with a diagnosis
of severe encephalopathy.  It has shown further deterioration since last time in
spite of stopping the sedation, which would be consistent with poor prognosis
for reasonable medical recovery, if clinically correlated.
 
 
 
 
 
 
 
 
 
 
 
 
 
 
 
 
 
 
 
 
 
 
 
       <ELECTRONICALLY SIGNED>
   By: Norbert Ibrahim MD         
  11/01/19     1417
D: 10/31/19 1125                           _____________________________________
T: 10/31/19 1203                           Norbert Ibrahim MD           /nt

## 2019-11-01 NOTE — EEG
[facility]
[address]
[place]  [address]                      ELECTROENCEPHALOGRAM          
_______________________________________________________________________________
 
Name:       MARGARITA GIBSON                Room #:         244-P       [facility] [place]  
M.R.#:      [id number]      Account #:      [id number]  
Admission:  10/24/19     Attend Phys:    Isra Flaherty
Discharge:  10/31/19     Date of Birth:  [birth year]  
                                                           Report #: [id number]
    [id number]  
_______________________________________________________________________________
THIS REPORT FOR:   //name//                          
 
CC: Edenilson Lamas
 
DATE OF SERVICE:  10/25/2019
 
 
This patient's EEG was done when the patient is on hypothermia protocol and
sedation.  EEG demonstrates a pattern suggestive of burst suppression, but is
not a typical burst suppression pattern because during the burst, the patient
does not have well-defined epileptiform activity.  Photic stimulation is
unremarkable.
 
IMPRESSION:  This is an abnormal EEG because it demonstrates finding suggestive
of burst suppression.  It is not a typical burst suppression.  This is because
typically the burst of activity and burst supression has some epileptiform
activity and the burst is shorter and this is atypical burst.
 
Since this patient's EEG, another EEG was done, which was done on ____.  That
EEG demonstrates better activity than this activity, but still the activity is
markedly abnormal.  That report is dictated separately.  This patient will need
multiple serial EEGs to determine the prognosis better.  Clinical correlation
will also be required.  The patient has well-defined cortical activity on both
EEGs.
 
Thank you very much for this referral and if you have any question, please feel
free to contact me.
 
 
 
 
 
 
 
 
 
 
 
 
 
 
 
       <ELECTRONICALLY SIGNED>
   By: Norbert Ibrahim MD         
  11/01/19     1417
D: 10/26/19 1546                           _____________________________________
T: 10/26/19 1630                           Norbert Ibrahim MD           /nt

## 2019-11-01 NOTE — EEG
[facility]
[address]
[place]  [address]                      ELECTROENCEPHALOGRAM          
_______________________________________________________________________________
 
Name:       MARGARITA GIBSON                Room #:         244-P       [facility] [place]  
M.R.#:      [id number]      Account #:      [id number]  
Admission:  10/24/19     Attend Phys:    Isra Flaherty
Discharge:  10/31/19     Date of Birth:  [birth year]  
                                                           Report #: [id number]
    [id number]  
_______________________________________________________________________________
THIS REPORT FOR:   //name//                          
 
CC: Edenilson Lamas
 
DATE OF SERVICE:  10/25/2019
 
 
INDICATIONS:  This patient is being evaluated post-cardiac arrest.
 
EEG was done by placing the electrode by standard 10-20 system of electrode
placement.  Both referential and sequential montages were used for recording. 
This is a severely abnormal EEG.  It demonstrates multiple abnormalities.  He
does appear to be showing frontally predominant triphasic waves.  On occasion,
it looks like it is showing burst suppression pattern.  I cannot fully exclude
epileptiform activity.
 
IMPRESSION:  This is a severely abnormal EEG, which would be consistent with
encephalopathy.  The patient will need repeat EEG when the patient is off the
cooling protocol and hopefully without sedation to further prognosticate him. 
Some of the sharper activity may be seizure activity.
 
Thank you very much for this referral.  Clinical correlation is recommended.
 
 
 
 
 
 
 
 
 
 
 
 
 
 
 
 
 
 
 
 
       <ELECTRONICALLY SIGNED>
   By: Norbert Ibrahim MD         
  11/01/19     1417
D: 10/26/19 1540                           _____________________________________
T: 10/26/19 1632                           Norbert Ibrahim MD           /nt

## 2019-11-01 NOTE — HC
[facility]
[address]
[place]   [address]                     CONSULTATION                  
_______________________________________________________________________________
 
Name:       MARGARITA GIBSON               Room #:         244-P       [facility] [place]  
.R.#:      [id number]                       Account #:      [id number]  
Admission:  10/24/19    Attend Phys:    Isra García
Discharge:  10/31/19    Date of Birth:  [birth year]  
                                                          Report #: [id number]
                                                                    [id number]   
_______________________________________________________________________________
THIS REPORT FOR:   //name//                          
 
CC: Sunny Lamas
 
DATE OF SERVICE:  10/24/2019
 
 
HISTORY OF PRESENT ILLNESS:  A 74-year-old male patient who was seen by me to
prognosticate the patient from hypoxic encephalopathy.  The patient is unable to
provide any history and no family member is here.  The records were reviewed and
it looks like this patient has numerous problems.  He had cardiopulmonary arrest
and record indicates that he has a prior history of coronary artery disease and
alcoholic cerebellar degeneration.  He apparently also has a history of squamous
cell carcinoma.  He also has pneumothorax.
 
REVIEW OF SYSTEMS:  A 14-point review of system is from the record and the
patient has a history of sleep apnea.  He uses CPAP.  He had a cardiopulmonary
arrest.  He had a cardiac stent.  He had alcoholic cerebellar degeneration.  He
has a squamous cell carcinoma.  He had a recent surgery.  This was the relevant
14-point review of system, which I can get from the record.
 
PAST MEDICAL HISTORY:  As summarized above.
 
FAMILY HISTORY:  Unavailable.
 
SOCIAL HISTORY:  He does have a family, but they are not here.
 
PHYSICAL EXAMINATION:  Pretty limited.  He has a shivering like episodes, but
that is only when he stands.  His pupils are small and nonreactive.  He has had
tubes put in for the emphysema that is basically all the examination, which is
possible and his pulse rate is 64 and blood pressure is 117/76.  White count is
12.4.  He is a well-developed individual.  He is having both cardiac and
pulmonary problem at the moment.
 
DIAGNOSTIC STUDIES:  His CT scan of the head was done and that showed no acute
process.
 
IMPRESSION:  The patient's clinical presentation is consistent with hypoxic
encephalopathy.  We will get an EEG done on him as an initial testing and Dr. Kim is on call from tomorrow and she will follow up with you.  I talked to the
 
 
 
[facility]
[address]
[place]   [address]                     CONSULTATION                  
_______________________________________________________________________________
 
Name:       ARTHURMARGARITA E               Room #:         244-P       [facility] [place]  
M.R.#:      [id number]                       Account #:      [id number]  
Admission:  10/24/19    Attend Phys:    Isra García
Discharge:  10/31/19    Date of Birth:  [birth year]  
                                                          Report #: [id number]
                                                                    [id number]   
_______________________________________________________________________________
nurses that they can talk to Pulmonary and if they want to start this patient on
some paralyzing agent that is fine with me.  He is already on propofol.
 
 
 
 
 
 
 
 
 
 
 
 
 
 
 
 
 
 
 
 
 
 
 
 
 
 
 
 
 
 
 
 
 
 
 
 
 
 
 
 
 
 
  <ELECTRONICALLY SIGNED>
   By: Norbert Ibrahim MD         
  11/01/19     1417
D: 10/24/19 1941                           _____________________________________
T: 10/25/19 0008                           Norbert Ibrahim MD           /nt